# Patient Record
Sex: FEMALE | Race: WHITE | NOT HISPANIC OR LATINO | Employment: PART TIME | URBAN - METROPOLITAN AREA
[De-identification: names, ages, dates, MRNs, and addresses within clinical notes are randomized per-mention and may not be internally consistent; named-entity substitution may affect disease eponyms.]

---

## 2017-05-26 ENCOUNTER — TELEPHONE (OUTPATIENT)
Dept: OBSTETRICS AND GYNECOLOGY | Facility: CLINIC | Age: 22
End: 2017-05-26

## 2017-05-26 NOTE — TELEPHONE ENCOUNTER
Swing pt stating that she would like to change her birth control. She is having trouble remembering to take the pill. States that she used to take the depo shot and would like to go back to that. Also was supposed to start her period a few days ago and it has not come. Advised to take a upt and get back with us. Allergies and pharmacy are up to date. Prescription for depo shot pended.

## 2017-05-26 NOTE — TELEPHONE ENCOUNTER
Swing pt - pt said she is 3 days late for her period and wants to know if she should be worried about being pregnant or should she wait a few more days.  Pt also would like to see about switching from an ocp to the depo shot.

## 2017-05-30 ENCOUNTER — TELEPHONE (OUTPATIENT)
Dept: OBSTETRICS AND GYNECOLOGY | Facility: CLINIC | Age: 22
End: 2017-05-30

## 2017-05-30 RX ORDER — MEDROXYPROGESTERONE ACETATE 150 MG/ML
150 INJECTION, SUSPENSION INTRAMUSCULAR
Qty: 1 ML | Refills: 2 | Status: SHIPPED | OUTPATIENT
Start: 2017-05-30 | End: 2018-01-22 | Stop reason: SDUPTHER

## 2017-05-30 NOTE — TELEPHONE ENCOUNTER
Pt has started her period and wants to  Depo shot and come to the office to get it administered.  Pt's depo shot Rx is pended for you to sign. Please review and sign Rx

## 2017-05-30 NOTE — TELEPHONE ENCOUNTER
Pt is calling again to see if the medication of the depo has been sent in yet she is still waiting. She was told to start the depo when she starts her period pt has started and want to get meds and come in for injection. P,ease call pt when meds are sent in.Pt # [phone]

## 2017-06-01 ENCOUNTER — CLINICAL SUPPORT (OUTPATIENT)
Dept: OBSTETRICS AND GYNECOLOGY | Facility: CLINIC | Age: 22
End: 2017-06-01
Payer: COMMERCIAL

## 2017-06-01 VITALS — SYSTOLIC BLOOD PRESSURE: 104 MMHG | DIASTOLIC BLOOD PRESSURE: 70 MMHG

## 2017-06-01 DIAGNOSIS — Z30.9 ENCOUNTER FOR CONTRACEPTIVE MANAGEMENT, UNSPECIFIED TYPE: Primary | ICD-10-CM

## 2017-06-01 PROCEDURE — 99999 PR PBB SHADOW E&M-EST. PATIENT-LVL I: CPT | Mod: PBBFAC,,,

## 2017-06-01 PROCEDURE — 96372 THER/PROPH/DIAG INJ SC/IM: CPT | Mod: S$GLB,,, | Performed by: OBSTETRICS & GYNECOLOGY

## 2017-06-01 RX ORDER — MEDROXYPROGESTERONE ACETATE 150 MG/ML
150 INJECTION, SUSPENSION INTRAMUSCULAR
Status: SHIPPED | OUTPATIENT
Start: 2017-06-01

## 2017-06-01 RX ADMIN — MEDROXYPROGESTERONE ACETATE 150 MG: 150 INJECTION, SUSPENSION INTRAMUSCULAR at 09:06

## 2017-06-01 NOTE — PROGRESS NOTES
Ordering Physician: Dr. Worrell    Order Type: transcribed from rx     During visit today patient received injection of Depo provera to right ventrogluteal. Patient tolerated well, no allergic reaction noted. Requested patient to remain 10 minutes after injection.     Pre-Pain Scale:None     Post Pain Scale:None    UPT not done, pt is unable to void, states she has not had intercourse in 6 months and is on her period.  She was on OCP and was due to start a new pack of pills this Sunday 6/4/17

## 2017-08-10 ENCOUNTER — CLINICAL SUPPORT (OUTPATIENT)
Dept: OBSTETRICS AND GYNECOLOGY | Facility: CLINIC | Age: 22
End: 2017-08-10
Payer: COMMERCIAL

## 2017-08-10 DIAGNOSIS — Z30.9 ENCOUNTER FOR CONTRACEPTIVE MANAGEMENT, UNSPECIFIED TYPE: Primary | ICD-10-CM

## 2017-08-10 PROCEDURE — 96372 THER/PROPH/DIAG INJ SC/IM: CPT | Mod: S$GLB,,, | Performed by: OBSTETRICS & GYNECOLOGY

## 2017-08-10 RX ADMIN — MEDROXYPROGESTERONE ACETATE 150 MG: 150 INJECTION, SUSPENSION INTRAMUSCULAR at 09:08

## 2017-08-10 NOTE — PROGRESS NOTES
Ordering Physician: Dr. Worrell    Order Type: transcribed from rx    During visit today patient received injection of depo provera to right ventrogluteal. Patient tolerated well, no allergic reaction noted. Requested patient to remain 10 minutes after injection.     Pre-Pain Scale:None     Post Pain Scale:None

## 2017-09-06 ENCOUNTER — OFFICE VISIT (OUTPATIENT)
Dept: URGENT CARE | Facility: CLINIC | Age: 22
End: 2017-09-06
Payer: COMMERCIAL

## 2017-09-06 VITALS
HEART RATE: 77 BPM | OXYGEN SATURATION: 99 % | RESPIRATION RATE: 18 BRPM | TEMPERATURE: 99 F | SYSTOLIC BLOOD PRESSURE: 116 MMHG | DIASTOLIC BLOOD PRESSURE: 80 MMHG | WEIGHT: 170 LBS | HEIGHT: 62 IN | BODY MASS INDEX: 31.28 KG/M2

## 2017-09-06 DIAGNOSIS — J01.90 ACUTE BACTERIAL SINUSITIS: Primary | ICD-10-CM

## 2017-09-06 DIAGNOSIS — B96.89 ACUTE BACTERIAL SINUSITIS: Primary | ICD-10-CM

## 2017-09-06 PROCEDURE — 99213 OFFICE O/P EST LOW 20 MIN: CPT | Mod: 25,S$GLB,, | Performed by: INTERNAL MEDICINE

## 2017-09-06 PROCEDURE — 96372 THER/PROPH/DIAG INJ SC/IM: CPT | Mod: S$GLB,,, | Performed by: INTERNAL MEDICINE

## 2017-09-06 PROCEDURE — 3008F BODY MASS INDEX DOCD: CPT | Mod: S$GLB,,, | Performed by: INTERNAL MEDICINE

## 2017-09-06 RX ORDER — BETAMETHASONE SODIUM PHOSPHATE AND BETAMETHASONE ACETATE 3; 3 MG/ML; MG/ML
9 INJECTION, SUSPENSION INTRA-ARTICULAR; INTRALESIONAL; INTRAMUSCULAR; SOFT TISSUE ONCE
Status: COMPLETED | OUTPATIENT
Start: 2017-09-06 | End: 2017-09-06

## 2017-09-06 RX ORDER — AZITHROMYCIN 250 MG/1
TABLET, FILM COATED ORAL
Qty: 6 TABLET | Refills: 0 | Status: SHIPPED | OUTPATIENT
Start: 2017-09-06 | End: 2017-11-22

## 2017-09-06 RX ADMIN — BETAMETHASONE SODIUM PHOSPHATE AND BETAMETHASONE ACETATE 9 MG: 3; 3 INJECTION, SUSPENSION INTRA-ARTICULAR; INTRALESIONAL; INTRAMUSCULAR; SOFT TISSUE at 09:09

## 2017-09-06 NOTE — PROGRESS NOTES
"Subjective:       Patient ID: Tianna Brooks is a 22 y.o. female.    Vitals:  height is 5' 2" (1.575 m) and weight is 77.1 kg (170 lb). Her temperature is 98.5 °F (36.9 °C). Her blood pressure is 116/80 and her pulse is 77. Her respiration is 18 and oxygen saturation is 99%.     Chief Complaint: Cough (Started 3 days ago. Dry cough)    Cough   This is a new problem. The current episode started in the past 7 days. The problem has been unchanged. The problem occurs every few minutes. The cough is non-productive. Pertinent negatives include no chest pain, chills, ear pain, eye redness, fever, headaches, myalgias, sore throat, shortness of breath or wheezing. The treatment provided no relief.     Review of Systems   Constitution: Negative for chills, fever and malaise/fatigue.   HENT: Positive for congestion. Negative for ear pain, hoarse voice and sore throat.    Eyes: Negative for discharge and redness.   Cardiovascular: Negative for chest pain, dyspnea on exertion and leg swelling.   Respiratory: Positive for cough. Negative for shortness of breath, sputum production and wheezing.    Musculoskeletal: Negative for myalgias.   Gastrointestinal: Negative for abdominal pain and nausea.   Neurological: Negative for headaches.       Objective:      Physical Exam   Constitutional: She appears well-developed and well-nourished.   HENT:   Nose: Mucosal edema (mucosal erythema with purulent drainage) present.   Eyes: Conjunctivae and EOM are normal. Pupils are equal, round, and reactive to light.   Neck: Normal range of motion. Neck supple.   Cardiovascular: Normal rate and regular rhythm.    Pulmonary/Chest: Effort normal.   Upper airway congestion   Skin: Skin is warm and dry.       Assessment:       1. Acute bacterial sinusitis        Plan:         Acute bacterial sinusitis  -     betamethasone acetate-betamethasone sodium phosphate injection 9 mg; Inject 1.5 mLs (9 mg total) into the muscle once.  -     azithromycin " (ZITHROMAX Z-ILEANA) 250 MG tablet; Take 2 tablets (500 mg) on  Day 1,  followed by 1 tablet (250 mg) once daily on Days 2 through 5.  Dispense: 6 tablet; Refill: 0

## 2017-10-02 ENCOUNTER — TELEPHONE (OUTPATIENT)
Dept: OBSTETRICS AND GYNECOLOGY | Facility: CLINIC | Age: 22
End: 2017-10-02

## 2017-10-02 NOTE — TELEPHONE ENCOUNTER
Pt has gotten 2 depo injections but still having BTB.  She is not due for her next injection until 10/26 but started having a moderate flow.  Changing tampon every 3-4 hours.  Advised it may take 2-3 injections before the BTB stops.  She is also concerned with weight gain.  She is dieting/training and not losing weight.  She will get one more injection and if still having BTB she will change OCP.

## 2017-10-02 NOTE — TELEPHONE ENCOUNTER
Swing pt - pt said since she started the depo injections she has continued bleeding and having really bad abdominal cramping. She would like to see if she can switch to a different type of birth control.

## 2017-10-31 ENCOUNTER — CLINICAL SUPPORT (OUTPATIENT)
Dept: OBSTETRICS AND GYNECOLOGY | Facility: CLINIC | Age: 22
End: 2017-10-31
Payer: COMMERCIAL

## 2017-10-31 ENCOUNTER — TELEPHONE (OUTPATIENT)
Dept: OBSTETRICS AND GYNECOLOGY | Facility: CLINIC | Age: 22
End: 2017-10-31

## 2017-10-31 DIAGNOSIS — Z30.9 ENCOUNTER FOR CONTRACEPTIVE MANAGEMENT, UNSPECIFIED TYPE: Primary | ICD-10-CM

## 2017-10-31 PROCEDURE — 96372 THER/PROPH/DIAG INJ SC/IM: CPT | Mod: S$GLB,,, | Performed by: OBSTETRICS & GYNECOLOGY

## 2017-10-31 RX ADMIN — MEDROXYPROGESTERONE ACETATE 150 MG: 150 INJECTION, SUSPENSION INTRAMUSCULAR at 09:10

## 2017-10-31 NOTE — TELEPHONE ENCOUNTER
Spoke with pt.  Never saw message from 10/2 until today.  She got depo provera today.  She says she spots 2 weeks before due for next shot and then has a period for a week.  After she gets shot only spots 2 days and then it stops.  She is also having issues with weight gain.  We discussed OCP. OE, and Nuvaring.  She will let me know what she wants to do when the next shot is close to being due

## 2017-10-31 NOTE — TELEPHONE ENCOUNTER
Pt came in for depo injection today.  She never heard back from MD but wanted to say she started bleeding again 2 days ago.  She received depo injection today, this was her 3rd injection.

## 2017-10-31 NOTE — PROGRESS NOTES
Ordering Physician: Dr. Worrell    Order Type: Verbal     During visit today patient received injection of depo provera to right ventrogluteal. Patient tolerated well, no allergic reaction noted. Requested patient to remain 10 minutes after injection.     Pre-Pain Scale:None     Post Pain Scale:None

## 2017-11-22 ENCOUNTER — OFFICE VISIT (OUTPATIENT)
Dept: URGENT CARE | Facility: CLINIC | Age: 22
End: 2017-11-22
Payer: COMMERCIAL

## 2017-11-22 VITALS
HEART RATE: 79 BPM | TEMPERATURE: 99 F | BODY MASS INDEX: 31.28 KG/M2 | RESPIRATION RATE: 18 BRPM | OXYGEN SATURATION: 98 % | WEIGHT: 170 LBS | HEIGHT: 62 IN | DIASTOLIC BLOOD PRESSURE: 75 MMHG | SYSTOLIC BLOOD PRESSURE: 116 MMHG

## 2017-11-22 DIAGNOSIS — J02.9 SORETHROAT: ICD-10-CM

## 2017-11-22 DIAGNOSIS — J06.9 VIRAL UPPER RESPIRATORY TRACT INFECTION: Primary | ICD-10-CM

## 2017-11-22 LAB
CTP QC/QA: YES
S PYO RRNA THROAT QL PROBE: NEGATIVE

## 2017-11-22 PROCEDURE — 99213 OFFICE O/P EST LOW 20 MIN: CPT | Mod: 25,S$GLB,, | Performed by: NURSE PRACTITIONER

## 2017-11-22 PROCEDURE — 96372 THER/PROPH/DIAG INJ SC/IM: CPT | Mod: S$GLB,,, | Performed by: EMERGENCY MEDICINE

## 2017-11-22 PROCEDURE — 87880 STREP A ASSAY W/OPTIC: CPT | Mod: QW,S$GLB,, | Performed by: NURSE PRACTITIONER

## 2017-11-22 RX ORDER — FLUTICASONE PROPIONATE 50 MCG
1 SPRAY, SUSPENSION (ML) NASAL DAILY
Qty: 16 G | Refills: 0 | Status: SHIPPED | OUTPATIENT
Start: 2017-11-22 | End: 2018-01-26 | Stop reason: ALTCHOICE

## 2017-11-22 RX ORDER — BETAMETHASONE SODIUM PHOSPHATE AND BETAMETHASONE ACETATE 3; 3 MG/ML; MG/ML
9 INJECTION, SUSPENSION INTRA-ARTICULAR; INTRALESIONAL; INTRAMUSCULAR; SOFT TISSUE
Status: COMPLETED | OUTPATIENT
Start: 2017-11-22 | End: 2017-11-22

## 2017-11-22 RX ADMIN — BETAMETHASONE SODIUM PHOSPHATE AND BETAMETHASONE ACETATE 9 MG: 3; 3 INJECTION, SUSPENSION INTRA-ARTICULAR; INTRALESIONAL; INTRAMUSCULAR; SOFT TISSUE at 11:11

## 2017-11-22 NOTE — PROGRESS NOTES
"Subjective:       Patient ID: Tianna Brooks is a 22 y.o. female.    Vitals:  height is 5' 2" (1.575 m) and weight is 77.1 kg (170 lb). Her temperature is 99.2 °F (37.3 °C). Her blood pressure is 116/75 and her pulse is 79. Her respiration is 18 and oxygen saturation is 98%.     Chief Complaint: Sore Throat    Sore Throat    This is a new problem. The current episode started yesterday. The problem has been gradually worsening. Neither side of throat is experiencing more pain than the other. There has been no fever. The pain is at a severity of 5/10. The pain is mild. Associated symptoms include congestion, headaches and swollen glands. Pertinent negatives include no abdominal pain, coughing, ear pain, hoarse voice or shortness of breath. She has had exposure to strep. She has tried acetaminophen (dayquil and nyquil) for the symptoms. The treatment provided mild relief.     Review of Systems   Constitution: Negative for chills, fever and malaise/fatigue.   HENT: Positive for congestion and sore throat. Negative for ear pain and hoarse voice.    Eyes: Negative for discharge and redness.   Cardiovascular: Negative for chest pain, dyspnea on exertion and leg swelling.   Respiratory: Negative for cough, shortness of breath, sputum production and wheezing.    Musculoskeletal: Negative for myalgias.   Gastrointestinal: Negative for abdominal pain and nausea.   Neurological: Positive for headaches.       Objective:      Physical Exam   Constitutional: She is oriented to person, place, and time. She appears well-developed and well-nourished. She is cooperative.  Non-toxic appearance. She does not appear ill. No distress.   HENT:   Head: Normocephalic and atraumatic.   Right Ear: Hearing, tympanic membrane, external ear and ear canal normal.   Left Ear: Hearing, tympanic membrane, external ear and ear canal normal.   Nose: Nose normal. No mucosal edema, rhinorrhea or nasal deformity. No epistaxis. Right sinus exhibits no " maxillary sinus tenderness and no frontal sinus tenderness. Left sinus exhibits no maxillary sinus tenderness and no frontal sinus tenderness.   Mouth/Throat: Uvula is midline and mucous membranes are normal. No trismus in the jaw. Normal dentition. No uvula swelling. Posterior oropharyngeal erythema (mild with cobblestoning) present.   Eyes: Conjunctivae and lids are normal. No scleral icterus.   Sclera clear bilat   Neck: Trachea normal, full passive range of motion without pain and phonation normal. Neck supple.   Cardiovascular: Normal rate, regular rhythm, normal heart sounds, intact distal pulses and normal pulses.    Pulmonary/Chest: Effort normal and breath sounds normal. No respiratory distress.   Abdominal: Soft. Normal appearance and bowel sounds are normal. She exhibits no distension. There is no tenderness.   Musculoskeletal: Normal range of motion. She exhibits no edema or deformity.   Neurological: She is alert and oriented to person, place, and time. She exhibits normal muscle tone. Coordination normal.   Skin: Skin is warm, dry and intact. She is not diaphoretic. No pallor.   Psychiatric: She has a normal mood and affect. Her speech is normal and behavior is normal. Judgment and thought content normal. Cognition and memory are normal.   Nursing note and vitals reviewed.      Assessment:       1. Viral upper respiratory tract infection    2. Sorethroat        Plan:         Viral upper respiratory tract infection  -     betamethasone acetate-betamethasone sodium phosphate injection 9 mg; Inject 1.5 mLs (9 mg total) into the muscle one time.  -     fluticasone (FLONASE) 50 mcg/actuation nasal spray; 1 spray by Each Nare route once daily.  Dispense: 16 g; Refill: 0    Sorethroat  -     POCT RAPID STREP A      Patient Instructions   Please drink plenty of fluids.  Please get plenty of rest.  Please return here or go to the Emergency Department for any concerns or worsening of condition.  If you were  prescribed antibiotics, please take them to completion.  If you were given wait & see antibiotics, please wait 5-7 days before taking them, and only take them if your symptoms have worsened or not improved.  If you do begin taking the antibiotics, please take them to completion.  If you were prescribed a narcotic medication, do not drive or operate heavy equipment or machinery while taking these medications.  If you were given a steroid shot in the clinic and have also been given a prescription for a steroid such as Prednisone or a Medrol Dose Pack, please begin taking them tomorrow.  If you do not have Hypertension or any history of palpitations, it is ok to take over the counter Sudafed or Mucinex D or Allegra-D or Claritin-D or Zyrtec-D.  If you do take one of the above, it is ok to combine that with plain over the counter Mucinex or Allegra or Claritin or Zyrtec.  If for example you are taking Zyrtec -D, you can combine that with Mucinex, but not Mucinex-D.  If you are taking Mucinex-D, you can combine that with plain Allegra or Claritin or Zyrtec.   If you do have Hypertension or palpitations, it is safe to take Coricidin HBP for relief of sinus symptoms.  If not allergic, please take over the counter Tylenol (Acetaminophen) and/or Motrin (Ibuprofen) as directed for control of pain and/or fever.  Please follow up with your primary care doctor or specialist as needed.    If you  smoke, please stop smoking.  Viral Upper Respiratory Illness (Adult)  You have a viral upper respiratory illness (URI), which is another term for the common cold. This illness is contagious during the first few days. It is spread through the air by coughing and sneezing. It may also be spread by direct contact (touching the sick person and then touching your own eyes, nose, or mouth). Frequent handwashing will decrease risk of spread. Most viral illnesses go away within 7 to 10 days with rest and simple home remedies. Sometimes the  illness may last for several weeks. Antibiotics will not kill a virus, and they are generally not prescribed for this condition.    Home care  · If symptoms are severe, rest at home for the first 2 to 3 days. When you resume activity, don't let yourself get too tired.  · Avoid being exposed to cigarette smoke (yours or others).  · You may use acetaminophen or ibuprofen to control pain and fever, unless another medicine was prescribed. (Note: If you have chronic liver or kidney disease, have ever had a stomach ulcer or gastrointestinal bleeding, or are taking blood-thinning medicines, talk with your healthcare provider before using these medicines.) Aspirin should never be given to anyone under 18 years of age who is ill with a viral infection or fever. It may cause severe liver or brain damage.  · Your appetite may be poor, so a light diet is fine. Avoid dehydration by drinking 6 to 8 glasses of fluids per day (water, soft drinks, juices, tea, or soup). Extra fluids will help loosen secretions in the nose and lungs.  · Over-the-counter cold medicines will not shorten the length of time youre sick, but they may be helpful for the following symptoms: cough, sore throat, and nasal and sinus congestion. (Note: Do not use decongestants if you have high blood pressure.)  Follow-up care  Follow up with your healthcare provider, or as advised.  When to seek medical advice  Call your healthcare provider right away if any of these occur:  · Cough with lots of colored sputum (mucus)  · Severe headache; face, neck, or ear pain  · Difficulty swallowing due to throat pain  · Fever of 100.4°F (38°C)  Call 911, or get immediate medical care  Call emergency services right away if any of these occur:  · Chest pain, shortness of breath, wheezing, or difficulty breathing  · Coughing up blood  · Inability to swallow due to throat pain  Date Last Reviewed: 9/13/2015  © 3653-5382 [employer]. [address] [place] [address],  [place] [address]. All rights reserved. This information is not intended as a substitute for professional medical care. Always follow your healthcare professional's instructions.

## 2017-11-22 NOTE — PATIENT INSTRUCTIONS
Please drink plenty of fluids.  Please get plenty of rest.  Please return here or go to the Emergency Department for any concerns or worsening of condition.  If you were prescribed antibiotics, please take them to completion.  If you were given wait & see antibiotics, please wait 5-7 days before taking them, and only take them if your symptoms have worsened or not improved.  If you do begin taking the antibiotics, please take them to completion.  If you were prescribed a narcotic medication, do not drive or operate heavy equipment or machinery while taking these medications.  If you were given a steroid shot in the clinic and have also been given a prescription for a steroid such as Prednisone or a Medrol Dose Pack, please begin taking them tomorrow.  If you do not have Hypertension or any history of palpitations, it is ok to take over the counter Sudafed or Mucinex D or Allegra-D or Claritin-D or Zyrtec-D.  If you do take one of the above, it is ok to combine that with plain over the counter Mucinex or Allegra or Claritin or Zyrtec.  If for example you are taking Zyrtec -D, you can combine that with Mucinex, but not Mucinex-D.  If you are taking Mucinex-D, you can combine that with plain Allegra or Claritin or Zyrtec.   If you do have Hypertension or palpitations, it is safe to take Coricidin HBP for relief of sinus symptoms.  If not allergic, please take over the counter Tylenol (Acetaminophen) and/or Motrin (Ibuprofen) as directed for control of pain and/or fever.  Please follow up with your primary care doctor or specialist as needed.    If you  smoke, please stop smoking.  Viral Upper Respiratory Illness (Adult)  You have a viral upper respiratory illness (URI), which is another term for the common cold. This illness is contagious during the first few days. It is spread through the air by coughing and sneezing. It may also be spread by direct contact (touching the sick person and then touching your own eyes,  nose, or mouth). Frequent handwashing will decrease risk of spread. Most viral illnesses go away within 7 to 10 days with rest and simple home remedies. Sometimes the illness may last for several weeks. Antibiotics will not kill a virus, and they are generally not prescribed for this condition.    Home care  · If symptoms are severe, rest at home for the first 2 to 3 days. When you resume activity, don't let yourself get too tired.  · Avoid being exposed to cigarette smoke (yours or others).  · You may use acetaminophen or ibuprofen to control pain and fever, unless another medicine was prescribed. (Note: If you have chronic liver or kidney disease, have ever had a stomach ulcer or gastrointestinal bleeding, or are taking blood-thinning medicines, talk with your healthcare provider before using these medicines.) Aspirin should never be given to anyone under 18 years of age who is ill with a viral infection or fever. It may cause severe liver or brain damage.  · Your appetite may be poor, so a light diet is fine. Avoid dehydration by drinking 6 to 8 glasses of fluids per day (water, soft drinks, juices, tea, or soup). Extra fluids will help loosen secretions in the nose and lungs.  · Over-the-counter cold medicines will not shorten the length of time youre sick, but they may be helpful for the following symptoms: cough, sore throat, and nasal and sinus congestion. (Note: Do not use decongestants if you have high blood pressure.)  Follow-up care  Follow up with your healthcare provider, or as advised.  When to seek medical advice  Call your healthcare provider right away if any of these occur:  · Cough with lots of colored sputum (mucus)  · Severe headache; face, neck, or ear pain  · Difficulty swallowing due to throat pain  · Fever of 100.4°F (38°C)  Call 911, or get immediate medical care  Call emergency services right away if any of these occur:  · Chest pain, shortness of breath, wheezing, or difficulty  breathing  · Coughing up blood  · Inability to swallow due to throat pain  Date Last Reviewed: 9/13/2015  © 8771-0765 [employer]. [address] [place] [address], [place] [address]. All rights reserved. This information is not intended as a substitute for professional medical care. Always follow your healthcare professional's instructions.

## 2018-01-22 RX ORDER — MEDROXYPROGESTERONE ACETATE 150 MG/ML
150 INJECTION, SUSPENSION INTRAMUSCULAR
Qty: 1 ML | Refills: 3 | Status: SHIPPED | OUTPATIENT
Start: 2018-01-22 | End: 2018-01-26 | Stop reason: SDUPTHER

## 2018-01-22 RX ORDER — MEDROXYPROGESTERONE ACETATE 150 MG/ML
150 INJECTION, SUSPENSION INTRAMUSCULAR
Qty: 1 ML | Refills: 2 | OUTPATIENT
Start: 2018-01-22

## 2018-01-26 ENCOUNTER — CLINICAL SUPPORT (OUTPATIENT)
Dept: OBSTETRICS AND GYNECOLOGY | Facility: CLINIC | Age: 23
End: 2018-01-26
Payer: COMMERCIAL

## 2018-01-26 ENCOUNTER — OFFICE VISIT (OUTPATIENT)
Dept: OBSTETRICS AND GYNECOLOGY | Facility: CLINIC | Age: 23
End: 2018-01-26
Payer: COMMERCIAL

## 2018-01-26 VITALS
BODY MASS INDEX: 30.95 KG/M2 | DIASTOLIC BLOOD PRESSURE: 82 MMHG | SYSTOLIC BLOOD PRESSURE: 118 MMHG | HEIGHT: 62 IN | WEIGHT: 168.19 LBS

## 2018-01-26 DIAGNOSIS — Z30.42 ENCOUNTER FOR MANAGEMENT AND INJECTION OF DEPO-PROVERA: ICD-10-CM

## 2018-01-26 DIAGNOSIS — Z30.9 ENCOUNTER FOR CONTRACEPTIVE MANAGEMENT, UNSPECIFIED TYPE: Primary | ICD-10-CM

## 2018-01-26 DIAGNOSIS — Z01.419 WOMEN'S ANNUAL ROUTINE GYNECOLOGICAL EXAMINATION: Primary | ICD-10-CM

## 2018-01-26 PROCEDURE — 99395 PREV VISIT EST AGE 18-39: CPT | Mod: S$GLB,,, | Performed by: NURSE PRACTITIONER

## 2018-01-26 PROCEDURE — 99999 PR PBB SHADOW E&M-EST. PATIENT-LVL I: CPT | Mod: PBBFAC,,,

## 2018-01-26 PROCEDURE — 99999 PR PBB SHADOW E&M-EST. PATIENT-LVL III: CPT | Mod: PBBFAC,,, | Performed by: NURSE PRACTITIONER

## 2018-01-26 PROCEDURE — 96372 THER/PROPH/DIAG INJ SC/IM: CPT | Mod: S$GLB,,, | Performed by: OBSTETRICS & GYNECOLOGY

## 2018-01-26 RX ORDER — MEDROXYPROGESTERONE ACETATE 150 MG/ML
INJECTION, SUSPENSION INTRAMUSCULAR
Qty: 1 ML | Refills: 0 | OUTPATIENT
Start: 2018-01-26

## 2018-01-26 RX ORDER — MEDROXYPROGESTERONE ACETATE 150 MG/ML
150 INJECTION, SUSPENSION INTRAMUSCULAR
Qty: 1 ML | Refills: 3 | Status: SHIPPED | OUTPATIENT
Start: 2018-01-26

## 2018-01-26 RX ADMIN — MEDROXYPROGESTERONE ACETATE 150 MG: 150 INJECTION, SUSPENSION INTRAMUSCULAR at 11:01

## 2018-01-26 NOTE — PROGRESS NOTES
"Chief Complaint  · Annual Exam    HPI  1. . TATIANA--22 y.o. F  presents today for WWE. Her last pap smear was in/around . The pap smear at that time was negative, and she reports a history and no abnormal pap smears. She has not partner changes since last STI testing. The patient reports using condoms   0%  of the time. She Denies DV    2. Menstrual Cycles---No LMP recorded (lmp unknown). Cycles have been irregular since start of Depo    3. Contraception--The patient has no complaints about her BC at this time and she has been using  Depo    Health Management/Maintenance   Colonoscopy or FOBT-- not indicated     Bone Density/DEXA-- not indicated     Mammogram--- not indicated     Lipid Panel-- denies     Hemoglobin A1c-- denies    HPV  Vaccine series completed     Past Medical History:   Diagnosis Date    Chlamydia 2015    Depot contraception      Past Surgical History:   Procedure Laterality Date    NO PAST SURGERIES       OB History      Para Term  AB Living    0 0 0 0 0 0    SAB TAB Ectopic Multiple Live Births    0 0 0 0          Obstetric Comments    Menarche ~ 11        Social History     Social History Main Topics    Smoking status: Never Smoker    Smokeless tobacco: Never Used    Alcohol use Yes      Comment: socially     Drug use: No    Sexual activity: Not Currently     Partners: Male        ROS   Systemic: No fever, no bleeding, no recent weight loss, and no recent weight gain.  Breasts: No breast lump and no nipple discharge. No rashes  Cardiovascular: No chest pain or discomfort and no palpitations.  Pulmonary: No dyspnea, no cough, no hemoptysis, and no wheezing.  Gastrointestinal: No nausea, no bloating, no abdominal pain, and no hematochezia.  No diarrhea and no constipation.  Genitourinary: No hematuria, no urinary loss of control, and no dysuria.  Skin: No rash.  /82   Ht 5' 2" (1.575 m)   Wt 76.3 kg (168 lb 3.4 oz)   LMP  (LMP Unknown)   BMI 30.77 " kg/m²     Physical Exam   General Appearance: ° Well developed.  ° Well nourished.  Neck: Trachea: ° Showed no visual abnormalities.  Lymph Nodes:° No axillary adenopathy.  Breasts:General/bilateral: ° Appearance of the breast was normal.  ° Palpation of the breast revealed no abnormalities.  Lungs:°No use of accessory muscles noted or signs of respiratory distress    Cardiovascular:°No lower extremity swelling observed or palpated  Abdomen:Palpation: ° No abdominal tenderness.  ° No mass was palpated in the abdomen. °No enlarged liver or spleen noted or presence of hernia   Urinary System:Bladder: ° Normal. Urethra: ° Meatus showed no abnormalities.  ° No mass on the urethra.  Genitalia:External: ° Vulva was normal.  ° Genitalia exhibited no lesion.  Vagina: ° Mucosa was normal.  ° No abnormal vaginal discharge was observed.  Pelvic:° No ovarian mass. Cervix: ° No cervical discharge.  ° Showed no lesion.  ° Not tender. Smooth, pink and moist Uterus: ° Position was normal.  ° Did not have a mass, no enlargement.  ° Not tender. Uterine Adnexae: ° Uterine adnexa was not tender.  ° No tubal mass was noted.  Perineum:° Normal.  ° Did not have a mass.  Rectal: Rectum: ° Had no mass.  Neurological: ° No disorientation was observed.  Psychiatric: Affect: ° Normal.  Skin: ° No skin lesions/rashes visualized.  ° No perineal lesions.    Assessment/Plan:  1. WWE-annual WWE completed with a CBE.  Pap smear not obtained.  Patient to f/u in one year for annual WWE.      2. CBE--CBE today; Pt will need a MMG age 39         3. Education handout on preventative Feminine Hygiene and SBE/Breast awareness was provided to the patient.       4. STD screening--recommend safe sex, screening not obtained (not sexually active and no partner changes since last testing)     5. Weight-BMI is abnormal.  Pt to have healthy diet and exercise. Handout on healthy resources provided     6. HCM-discussed with pt when a MMG will be needed.  The pt does  not need a DEXA scan at this time. Handout on Calcium and Vit D provided on how much to take OTC and dietary     7. Contraception--Rproper use. A year refill was ordered today since the patient decided to continue Depo, discussed MVI with calcium       Pt to RTC prn and as scheduled for annual exam.

## 2018-01-26 NOTE — PATIENT INSTRUCTIONS
Things to Remember about Feminine Hygiene and Safety   1. Wipe from front to back after using the bathroom   2. If possible, urinate before and definitely after sexual intercourse or masturbation   3. I recommend bathing with liquid soap vs a bar soap. Non-scented antibacterial soap: Dial or Neutrogena soap   4. Shower or rinse off before sitting in a bathtub full of dirty water   5. Do not douche of any kind   6. It is recommended that you take in plenty of fluids. Eight glasses of 8 ounces of water is recommended daily (UNLESS MEDICAL PROBLEMS INDICATE OTHERWISE)   7. It is recommended to change tampons and pads every 2-3 hours or as saturated   8. Use condoms to protect yourself against Sexually Transmitted Infections   9. Wear your seat belt    Self Breast Exam/Awareness (SBE)  1. Examine your breast monthly, about 4-7 days after the start of your cycle. If you no longer get a cycle pick the same day of the month to perform SBE.  2. Look for any dimpling, rashes, nipple discharge, or changes in breast size  3. Feel for any lumps or hard nodules (example, frozen vegetable) using the pads of your fingers. Make sure to examine under the arm  4. The best time to perform examine is either while in the shower or while lying down.  5. You know your breast and if you are more aware of what your breasts normally feel like then you will be able to identify changes more quickly and notify your health care provider    Tobacco Resources    Pick a start date and stick with it   OTC options: nicotine gum, patch, lozenge   Talk with your primary care doctor about prescription options: Nicotine nasal spray, Bupropion (pill), Varenicline (pill)   Inform clothes family and friends of quit date for support   Call 9-224-QLFHFLT ([phone]) to be connected with the quit line in your state (free)   Avoid being around smoking, avoid alcohol, eat healthy food and exercise   Keep positive attitude, You Can Do It!    Healthy Weight  Resources   [phone]-- this clinic can help you formulate individualized nutrition and exercise plan and may include medications for patients who qualify. Clinic can help you meet your weight loss goals and long term health goals.    Maria Luisalesley (can be found on [employer] store--free)   National Weight Control Registry    Follow the 9 inch plate method below to help with portion control. ( ½ plate vegetable, ¼ plate starch and ¼ plate protein) After Start incorporating healthy foods in each plate section.   DASH Diet--lowering blood pressure (http://www.nhlbi.nih.gov/health/public/heart/hbp/dash/new_dash.pdf)     Calcium and Vitamin D  Prepared for the subscribers of  Pharmacists Letter / Prescribers Letter to give to their patients.  Copyright © 2012 by [employer]  www.pharmacistsletter.com ~ www.prescribersletter.com  Why do I need calcium and vitamin D?  Calcium and vitamin D are needed for strong bones. Nerves, muscles, and blood  vessels need calcium to work. Vitamin D helps the body absorb calcium. Vitamin D  helps strengthen muscles and prevent falls in older people. Vitamin D might also  help prevent cancer and heart disease.  What are sources of calcium and vitamin D?  Calcium is found in foods. Dairy products are good sources. Eight ounces of yogurt  (228 gram) or milk (1 cup [236 mL]), or 1.5 oz. (43 gram) of cheese, can provide  around 300 mg. Orange juice with added calcium has 300 mg per 8 oz. (236 mL)  serving. Vitamin D is made by sun-exposed skin. It is also found in some foods.  [place] is one of the best sources. A 3 oz. (86 gram) serving of sockeye salmon has  almost 800 IU. A 3 oz. serving of tuna canned in water has about  150 IU. Dairy products with added vitamin D are good sources. Examples are a cup  of milk (115 to 124 IU) or 6 ozs. (171 grams) of yogurt (80 IU). A cup of orange  juice with added vitamin D has 80 IU. Calcium and vitamin  D supplements are also  available.  Do I need a supplement? Are they safe?  Many people are low on vitamin D. It is hard to get enough vitamin D from food.  And most people dont get much sun exposure. They use sunscreens, stay indoors, or  live at a northern latitude. So most people need a vitamin D supplement. Ask if you  should have your vitamin D level checked.  People get about 300 mg calcium from nondairy foods daily. If you eat two servings  of high-calcium foods (e.g., dairy), you can get around 900 mg per day total. Adding  a 300 mg calcium supplement daily, or a third high-calcium serving, will provide a  total of 1200 mg daily. You may have heard calcium supplements are not safe.  There has been bad press about heart attacks and prostate cancer. Calcium  supplements have not been proven unsafe. But dont go overboard with calcium  supplements. Get your calcium from diet when possible. Avoid calcium  supplements from coral or dolomite, a kind of limestone. They can contain heavy  metals like lead.  How do I choose a calcium or vitamin D supplement?  Most calcium products are calcium carbonate (e.g., Tums, Caltrate) or calcium  citrate (e.g., Citracal). Both work. Calcium carbonate doesnt cost much and  provides the most calcium per dose. Read the label to check the calcium amount  per serving. This can vary based on the type of calcium you select. Calcium  Calcium and Vitamin D  Prepared for the subscribers of  Pharmacists Letter / Prescribers Letter to give to their patients.  Copyright © 2012 by [employer]  www.pharmacistsletter.com ~ www.prescribersletter.com  citrate may be better for patients who dont absorb calcium as well. Examples are  older people or those on certain heartburn medications. Calcium is best absorbed if  not more than 500 mg is taken at a time. Some supplements contain other  ingredients (e.g., magnesium, vitamin K). These dont work any better than those  with  just calcium. Vitamin D is available over-the-counter in some calcium products  or by itself. There are also high-dose vitamin D products that are prescribed if you  have low vitamin D levels. It is okay to take a multivitamin or eat vitamin Dcontaining  foods while taking prescription-strength vitamin D. Vitamin D comes as  vitamin D2 or vitamin D3. Either can be used. In the [place] look for a vitamin D  supplement that is USP Verified. In [place], look for a product with a Natural  Product Number (NPN). These products have been tested for good quality.  How much calcium and vitamin D do I need?  Women up to 50 years old and men up to age 70 should aim for 1000 mg of calcium  daily total (from food and supplements). Women over 50 years old and men over  70 should aim for 1200 mg of calcium daily total (from food and supplements).  Most experts recommend that adults get 800 IU to 2000 IU of vitamin D daily for the  best health benefits.  [June 2012]

## 2018-04-12 ENCOUNTER — CLINICAL SUPPORT (OUTPATIENT)
Dept: OBSTETRICS AND GYNECOLOGY | Facility: CLINIC | Age: 23
End: 2018-04-12
Payer: COMMERCIAL

## 2018-04-12 DIAGNOSIS — Z30.9 ENCOUNTER FOR CONTRACEPTIVE MANAGEMENT, UNSPECIFIED TYPE: Primary | ICD-10-CM

## 2018-04-12 PROCEDURE — 96372 THER/PROPH/DIAG INJ SC/IM: CPT | Mod: S$GLB,,, | Performed by: OBSTETRICS & GYNECOLOGY

## 2018-04-12 RX ADMIN — MEDROXYPROGESTERONE ACETATE 150 MG: 150 INJECTION, SUSPENSION INTRAMUSCULAR at 02:04

## 2018-04-12 NOTE — PROGRESS NOTES
Ordering Provider: Dr. Worrell    During visit today patient received an injection of Depo Provera to right ventrogluteal.  Tolerated well.  Instructed pt to remain 15 minutes after injection to monitor for reactions.      Pre pain scale: none    Post pain scale: none

## 2018-07-13 ENCOUNTER — CLINICAL SUPPORT (OUTPATIENT)
Dept: OBSTETRICS AND GYNECOLOGY | Facility: CLINIC | Age: 23
End: 2018-07-13
Payer: COMMERCIAL

## 2018-07-13 DIAGNOSIS — Z30.9 ENCOUNTER FOR CONTRACEPTIVE MANAGEMENT, UNSPECIFIED TYPE: Primary | ICD-10-CM

## 2018-07-13 DIAGNOSIS — Z32.02 URINE PREGNANCY TEST NEGATIVE: ICD-10-CM

## 2018-07-13 NOTE — PROGRESS NOTES
Depo provera not given, pt was in a rush and unable to give a urine sample.  She will come back next week.